# Patient Record
Sex: FEMALE | URBAN - METROPOLITAN AREA
[De-identification: names, ages, dates, MRNs, and addresses within clinical notes are randomized per-mention and may not be internally consistent; named-entity substitution may affect disease eponyms.]

---
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
## 2024-12-09 NOTE — PROCEDURE: PRESCRIPTION MEDICATION MANAGEMENT
From: Anabel Rodriguez  To: OSMANY Nelson  Sent: 5/22/2021 11:16 AM CDT  Subject: Medication Question    Dr Aron Goodman from the [facility] prescribed Diclonefac for back pain from arthritis. I read that there may be a serious interaction with the Lisinipril I am also taking for blood pressure. Please advise.  
Otc Regimen: CeraVe cleanser, AM lotion and PM lotion
Discontinue Regimen: OTC Cetaphil as it may be clogging her pores
Render In Strict Bullet Format?: No
Continue Regimen: Doxycycline 100mg BID for one more month, then 100mg QD for the following month, and lastly 50mg QD for following month
Initiate Treatment: Clindamycin-BP QAM\\nTretinoin 0.025% cream QHS every other night for 2 weeks, QHS as tolerated after
Detail Level: Generalized
0

## 2025-05-15 NOTE — PROCEDURE: PRESCRIPTION MEDICATION MANAGEMENT
no
Discontinue Regimen: Doxycycline
Render In Strict Bullet Format?: No
Continue Regimen: Clindamycin-BPO gel QAM
Initiate Treatment: Tretinoin 0.05% cream QOHS x 2 wks, then increase to QHS as tolerated\\nSodium sulfacetamide 10% wash BID
Detail Level: Generalized